# Patient Record
Sex: FEMALE | Race: WHITE | NOT HISPANIC OR LATINO | URBAN - METROPOLITAN AREA
[De-identification: names, ages, dates, MRNs, and addresses within clinical notes are randomized per-mention and may not be internally consistent; named-entity substitution may affect disease eponyms.]

---

## 2020-06-05 ENCOUNTER — COMMUNICATION - HEALTHEAST (OUTPATIENT)
Dept: PHYSICAL MEDICINE AND REHAB | Facility: CLINIC | Age: 25
End: 2020-06-05

## 2020-06-25 ENCOUNTER — COMMUNICATION - HEALTHEAST (OUTPATIENT)
Dept: TELEHEALTH | Facility: CLINIC | Age: 25
End: 2020-06-25

## 2020-06-25 ENCOUNTER — COMMUNICATION - HEALTHEAST (OUTPATIENT)
Dept: PHYSICAL MEDICINE AND REHAB | Facility: CLINIC | Age: 25
End: 2020-06-25

## 2020-06-25 ENCOUNTER — HOSPITAL ENCOUNTER (OUTPATIENT)
Dept: PHYSICAL MEDICINE AND REHAB | Facility: CLINIC | Age: 25
Discharge: HOME | End: 2020-06-25
Attending: STUDENT IN AN ORGANIZED HEALTH CARE EDUCATION/TRAINING PROGRAM

## 2020-06-25 DIAGNOSIS — M53.3 SACROILIAC JOINT DYSFUNCTION: ICD-10-CM

## 2020-06-25 DIAGNOSIS — M47.816 LUMBAR FACET ARTHROPATHY: ICD-10-CM

## 2020-06-25 DIAGNOSIS — M54.16 LUMBAR RADICULAR PAIN: ICD-10-CM

## 2020-06-25 DIAGNOSIS — M53.3 SACROILIAC JOINT PAIN: ICD-10-CM

## 2020-06-25 ASSESSMENT — MIFFLIN-ST. JEOR: SCORE: 2103.93

## 2021-01-07 ENCOUNTER — AMBULATORY - HEALTHEAST (OUTPATIENT)
Dept: CARDIOLOGY | Facility: CLINIC | Age: 26
End: 2021-01-07

## 2021-02-21 ENCOUNTER — COMMUNICATION - HEALTHEAST (OUTPATIENT)
Dept: SCHEDULING | Facility: CLINIC | Age: 26
End: 2021-02-21

## 2021-06-04 VITALS — WEIGHT: 290 LBS | HEIGHT: 68 IN | BODY MASS INDEX: 43.95 KG/M2

## 2021-06-09 NOTE — PROGRESS NOTES
ASSESSMENT: Robyn Maynard is a 24 y.o. female with past medical history significant for depression, anxiety, migraine, pulmonary embolus (February 2018 due to oral contraceptives, currently on aspirin), GERD, obesity who presents today for new patient evaluation of a 6-week history of left low back pain with radiation into the left lower extremity.  MRI lumbar spine shows advanced facet arthropathy at L5-S1 with moderate left foraminal stenosis.  I think pain is likely multifactorial.  On exam today she actually seemed most symptomatic from left sacroiliac joint dysfunction.  She had reproduction of her pain with left SI joint provocative maneuvers x4 and significant tenderness to palpation over the left SI joint.  She may be experiencing some proximal radicular pain into the left thigh related to the foraminal stenosis.  Patient reports that during her pregnancy she experienced pain in the full expression of the L5 dermatome, but that resolved after the birth of her child almost 1 year ago.  Finally, she may have some pain related to the lumbar facets, however, she did not have reproduction of her pain with lumbar facet loading maneuvers.  -Patient also has a 2-month history of numbness in the right anterolateral shin and dorsal foot, possibly related to some right L5 irritation, however, she does not have any pain in the right lower extremity.  - Other than the subjective sensory deficit in the right L5 pattern, she was neurologically intact.    AMY: 52  Who 5:11    PLAN:  A shared decision making model was used.  The patient's values and choices were respected.  The following represents what was discussed and decided upon by the physician assistant and the patient.      1.  DIAGNOSTIC TESTS: I reviewed the MRI lumbar spine.  No further diagnostic tests were ordered.    2.  PHYSICAL THERAPY: Patient had 1 session of physical therapy at [facility] in [place] about 2 weeks ago.  She then missed her next  appointment because of caring for her child.  She is doing her home exercises on a regular basis and they do not seem to be helping.  No further physical therapy was ordered.    3.  MEDICATIONS:    -Diclofenac 50 mg 3 times daily was prescribed.  She will use this instead of ibuprofen.  She has been using ibuprofen 400 mg every 6 hours.  Naproxen has not been as effective.  - Patient previously took gabapentin for migraine but had side effects.  She did not like the way it made her head feel.    4.  INTERVENTIONS:    -I offered the patient a left sacroiliac joint injection.  Patient indicated she would like to proceed.  However, patient is on a 6-month course of antibiotics for recurrent urinary tract infection.  We will need to get medical clearance from her urologist for the injection.  -If that does not help, I would recommend a left L5-S1 transforaminal epidural steroid injection.  - If that did not help, I recommend left L4, L5 medial branch blocks.    5.  PATIENT EDUCATION: Patient is in agreement the above plan.  All questions were answered.  -We briefly discussed trying a left SI belt.  Due to the patient's body habitus, I do not think she would tolerate this well.  - We also discussed the importance of working toward a more ideal body weight.  Patient is interested in bariatric surgery but was told that insurance would not cover it.  Therefore, she is trying to lose weight on her own.  She is doing the keto diet and has lost 20 pounds.    6.  FOLLOW-UP:   A nurse will call the patient after we have received word back from her urologist.  Hopefully we will be able to proceed with the left sacroiliac joint injection at that time.  If she has any questions or concerns in the meantime, she should not hesitate to call.      SUBJECTIVE:  Robyn Maynard  Is a 24 y.o. female who presents today as a referral from the emergency department for new patient evaluation of low back pain with radiation into the left  lower extremity.  Patient states that she dealt with left low back pain with radiation to the left lower extremity during her pregnancy over a year ago.  She states that her pain improved after the birth of her child almost 1 year ago.  Unfortunately, about 6 weeks ago, pain returned again.  She denies any specific injury or event to cause the pain.  She is not pregnant.  She had a tubal ligation.  She was seen in the emergency department and an MRI lumbar spine was obtained.  She was referred to our clinic for further evaluation and treatment.  She does not feel that the pain is improving.    Patient complains of pain which begins at the left sacral area.  She denies any significant pain in the low back itself.  The pain then radiates down the left posterior thigh to nearly the knee.  She denies any pain distal to the knee currently, however, when she was pregnant she experienced pain that radiated into the left lateral calf and into the left dorsal foot.  She denies any pain down the right lower extremity, however, for the past couple of months she has had constant numbness in the left lateral calf and dorsal foot.  She describes her left sided pain as burning and throbbing.  She denies any weakness associated with the pain.  Pain is aggravated with sitting too long.  Pain is aggravated with lying down.  She states that she cannot find a comfortable position to sleep and she is having difficulty sleeping.  Pain is alleviated with standing.  She has tried Tylenol, ibuprofen, heat, and ice without significant improvement.  Patient reports that she has a history of urinary incontinence and was diagnosed with overactive bladder.  She is on oxybutynin which has been helpful and she has not had any episodes of urinary incontinence is starting that medication.  She is also on a 6-month course of cephalexin 250 mg daily.  She has been taking this for about 1 month.  She denies any current UTI symptoms.  She denies any  loss of bowel control.    Patient attended 1 session of physical therapy through [facility] in [place] about 2 weeks ago.  She then missed her next appointment.  She has been doing her home exercises on a regular basis.  She does not think they are helping her.  She does not go to a chiropractor.  She has never had any spine surgeries or spine injections.  Patient is currently taking ibuprofen 400 mg every 6 hours.  She had previously been on gabapentin for migraine but stopped taking it because she did not like the way it made her head feel.    Current medications: Cephalexin, ibuprofen    Allergies   Allergen Reactions     Hydrocodone Itching     Past medical history: Depression, anxiety, migraine headaches, pulmonary embolus 2018    Surgical history:  section x3, cholecystectomy, appendectomy    Family history: Brother cerebral palsy, paternal grandmother ovarian cancer, paternal grandmother kidney disease    Social history: Patient is .  She lives with her boyfriend.  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  She denies tobacco, alcohol, or illicit drug use.      ROS: Positive for headache, palpitations, abdominal pain, pain with urinating, loss of bladder control, blood in urine, joint pain, muscle pain, sciatica, excessive tiredness, anxiety, depression, insomnia.  Specifically negative for patient is obese.  Fevers,chills, appetite changes, unexplained weight loss.   Otherwise 13 systems reviewed are negative.  Please see the patient's intake questionnaire from today for details.      OBJECTIVE:  PHYSICAL EXAMINATION:    CONSTITUTIONAL:  Vital signs as above.  No acute distress.  The patient is well nourished and well groomed.  PSYCHIATRIC:  The patient is awake, alert, oriented to person, place, time and answering questions appropriately with clear speech.    HEENT: Normocephalic, atraumatic.  Sclera clear.  Neck is supple.  SKIN:  Skin over the face,  bilateral lower extremities, and posterior torso is clean, dry, intact without rashes.    GAIT:  Gait is non-antalgic.  The patient is able to heel and toe walk without significant difficulty.  Patient is able to single-leg toe raise x5 on the left without difficulty.  STANDING EXAMINATION: Tender to palpation left lower lumbar paraspinous muscles at L5-S1.  Tender palpation left sacroiliac joint.  Lumbar flexion mildly restricted.  Lumbar extension intact.  Lumbar facet loading maneuvers did not reproduce pain.  MUSCLE STRENGTH:  The patient has 5/5 strength for the bilateral hip flexors, knee flexors/extensors, ankle dorsiflexors/plantar flexors, great toe extensors, ankle evertors/invertors.  NEUROLOGICAL:  2/4 patellar, and achilles reflexes bilaterally.  Negative Babinski's bilaterally.  No ankle clonus bilaterally.  Subjective diminished sensation right L5 dermatome.  VASCULAR:  2/4 dorsalis pedis pulses bilaterally.  Bilateral lower extremities are warm.  There is no pitting edema of the bilateral lower extremities.  ABDOMINAL:  Soft, non-distended, non-tender throughout all quadrants.  No pulsatile mass palpated in the left lower quadrant.  LYMPH NODES:  No palpable or tender inguinal lymph nodes.  MUSCULOSKELETAL: Straight leg raise negative on the right.  Straight leg raise on the left reproduces buttock and low back pain.  Negative pelvic distraction test.  Positive thigh thrust left, negative thigh thrust right.  Positive Jeovany's test left causing pain localizing to left SI joint, negative Jeovany's test right.  Positive Gaenslen's test left, negative Gaenslen's test right.  Positive Yeoman's test left, negative Yeoman's test right.    RESULTS: I reviewed the MRI lumbar spine from [facility] dated May 24, 2020.  At L5-S1 there is advanced bilateral facet arthropathy with moderate left and mild right foraminal stenosis.  There is bilateral facet arthropathy at L2-3, L3-4, and L4-5 as well, but no  additional neural impingement.  There is a synovial cyst on the left at L4-5 in the left paraspinal soft tissues and on the right at L5-S1 in the paraspinal soft tissues.

## 2021-06-09 NOTE — TELEPHONE ENCOUNTER
----- Message from Mimi Gardner PA-C sent at 6/25/2020  3:34 PM CDT -----  Regarding: medical clearance  Please call this patient's urologist, Dr. Julien, to get medical clearance for a left sacroiliac joint injection.  She is on a 6-month course of cephalexin for recurrent UTI.  Thanks@

## 2021-06-09 NOTE — TELEPHONE ENCOUNTER
Phone call placed to Dr. Julien's office in [place], [place]. Spoke with Jaya BOOTH and explained reason for the call. He then placed this nurse on hold while he checked with the provider himself. Dr. Julien is OK for patient to have the SI steroid injection.

## 2021-06-09 NOTE — TELEPHONE ENCOUNTER
Order placed in Epic. Phone call to pt to explain approval was obtained through her urologist. Injection requirements reviewed with patient. Stated understanding. Discussed steroids and COVID. Patient would like to proceed. Transferred to scheduling to make appointment.

## 2021-06-15 NOTE — TELEPHONE ENCOUNTER
Pt is calling.    Cut her left arm on a broken picture frame near near her forearm. 1 inch in length. Bleeding quite a bit per patient. She stated that it was very deep and wide. I advised her to get a clean towel and apply direct pressure to the wound and keep it elevated. Will need to go to the ED for evaluation and possible stitches. She verbalized understanding and will go now.    Reason for Disposition    [1] Bleeding AND [2] won't stop after 10 minutes of direct pressure (using correct technique)    Additional Information    Negative: [1] Major bleeding (e.g., actively dripping or spurting) AND [2] can't be stopped    Negative: Amputation    Negative: Shock suspected (e.g., cold/pale/clammy skin, too weak to stand, low BP, rapid pulse)    Negative: [1] Knife wound (or other possibly deep cut) AND [2] to chest, abdomen, back, neck, or head    Negative: [1] Cutter (self-mutilator) AND [2] suicidal or out-of-control    Negative: Sounds like a life-threatening emergency to the triager    Negative: [1] Animal bite AND [2] broken skin    Negative: [1] Human bite AND [2] broken skin    Protocols used: CUTS AND [id number] Kaur RN   [facility] Nurse Advisor  02/21/21 at 9:51 PM

## 2021-06-16 PROBLEM — N39.0 RECURRENT UTI: Status: ACTIVE | Noted: 2020-05-19

## 2021-06-16 PROBLEM — R35.0 FREQUENCY OF URINATION: Status: ACTIVE | Noted: 2020-05-19

## 2021-06-16 PROBLEM — F41.9 ANXIETY: Status: ACTIVE | Noted: 2020-08-07

## 2021-06-16 PROBLEM — F33.1 MODERATE EPISODE OF RECURRENT MAJOR DEPRESSIVE DISORDER (H): Status: ACTIVE | Noted: 2018-11-05

## 2021-06-16 PROBLEM — N92.0 MENORRHAGIA: Status: ACTIVE | Noted: 2018-05-14

## 2021-06-16 PROBLEM — G43.909 MIGRAINE HEADACHE: Status: ACTIVE | Noted: 2017-02-28

## 2021-06-16 PROBLEM — N39.46 MIXED INCONTINENCE: Status: ACTIVE | Noted: 2020-05-19

## 2021-06-16 PROBLEM — Z86.711 HISTORY OF PULMONARY EMBOLUS (PE): Status: ACTIVE | Noted: 2020-08-07

## 2021-06-16 PROBLEM — M51.369 DEGENERATION OF INTERVERTEBRAL DISC OF LUMBAR REGION: Status: ACTIVE | Noted: 2021-04-15

## 2021-06-16 PROBLEM — R31.0 GROSS HEMATURIA: Status: ACTIVE | Noted: 2020-05-19

## 2021-06-16 PROBLEM — R20.0 NUMBNESS OF LOWER EXTREMITY: Status: ACTIVE | Noted: 2018-01-10

## 2021-06-16 PROBLEM — R51.9 HEADACHE: Status: ACTIVE | Noted: 2017-08-23

## 2021-06-16 PROBLEM — R10.13 EPIGASTRIC PAIN: Status: ACTIVE | Noted: 2017-12-26

## 2021-06-16 PROBLEM — I27.82 CHRONIC PULMONARY EMBOLISM (H): Status: ACTIVE | Noted: 2018-02-20

## 2021-06-16 PROBLEM — E66.01 MORBID OBESITY (H): Status: ACTIVE | Noted: 2018-05-21

## 2021-06-16 PROBLEM — F32.9 MAJOR DEPRESSIVE DISORDER: Status: ACTIVE | Noted: 2018-11-05

## 2021-06-16 PROBLEM — N32.81 OAB (OVERACTIVE BLADDER): Status: ACTIVE | Noted: 2020-08-07

## 2021-06-20 NOTE — LETTER
Letter by Harlan Pedroza at      Author: Harlan Pedroza Service: -- Author Type: --    Filed:  Encounter Date: 6/5/2020 Status: (Other)         Robyn Maynard  [address]  [place] [address]                 June 5, 2020       Dear Ms. Maynard,    At [facility], we care about your health and well-being. Recently, we received a  referral to get you scheduled at the [facility]. We have attempted to  assist you in scheduling this appointment and have been unsuccessful in reaching you.    Please call our Intake line at your earliest convenience for assistance in scheduling an  appointment. Thank you for choosing [facility].      Sincerely,        [facility] Intake team  [phone]

## 2021-06-27 ENCOUNTER — HEALTH MAINTENANCE LETTER (OUTPATIENT)
Age: 26
End: 2021-06-27

## 2021-10-17 ENCOUNTER — HEALTH MAINTENANCE LETTER (OUTPATIENT)
Age: 26
End: 2021-10-17

## 2022-07-24 ENCOUNTER — HEALTH MAINTENANCE LETTER (OUTPATIENT)
Age: 27
End: 2022-07-24

## 2022-10-02 ENCOUNTER — HEALTH MAINTENANCE LETTER (OUTPATIENT)
Age: 27
End: 2022-10-02

## 2023-08-12 ENCOUNTER — HEALTH MAINTENANCE LETTER (OUTPATIENT)
Age: 28
End: 2023-08-12